# Patient Record
Sex: MALE
[De-identification: names, ages, dates, MRNs, and addresses within clinical notes are randomized per-mention and may not be internally consistent; named-entity substitution may affect disease eponyms.]

---

## 2024-02-15 ENCOUNTER — APPOINTMENT (OUTPATIENT)
Dept: ORTHOPEDIC SURGERY | Facility: CLINIC | Age: 29
End: 2024-02-15

## 2024-04-09 ENCOUNTER — NON-APPOINTMENT (OUTPATIENT)
Age: 29
End: 2024-04-09

## 2024-04-09 ENCOUNTER — APPOINTMENT (OUTPATIENT)
Dept: ORTHOPEDIC SURGERY | Facility: CLINIC | Age: 29
End: 2024-04-09
Payer: COMMERCIAL

## 2024-04-09 PROBLEM — Z00.00 ENCOUNTER FOR PREVENTIVE HEALTH EXAMINATION: Status: ACTIVE | Noted: 2024-04-09

## 2024-04-09 PROCEDURE — 73080 X-RAY EXAM OF ELBOW: CPT | Mod: RT

## 2024-04-09 PROCEDURE — 99204 OFFICE O/P NEW MOD 45 MIN: CPT

## 2024-04-09 NOTE — HISTORY OF PRESENT ILLNESS
[de-identified] : DELMAR is a 28 year male here today for right elbow pain. He endorses pain with extension. He localizes pain over the olecranon. He states that he leans on his elbow when driving. He enjoys working out.

## 2024-04-09 NOTE — DISCUSSION/SUMMARY
[de-identified] : We had a thorough discussion regarding the nature of his pain, the pathophysiology, as well as all treatment options. I recommend the patient apply Voltaren gel to the affected area. He should also wear a compression sleeve with a pad. All questions were answered and the patient verbalized understanding. The patient is in agreement with this treatment plan.  Rhomboid Transposition Flap Text: The defect edges were debeveled with a #15 scalpel blade.  Given the location of the defect and the proximity to free margins a rhomboid transposition flap was deemed most appropriate.  Using a sterile surgical marker, an appropriate rhomboid flap was drawn incorporating the defect.    The area thus outlined was incised deep to adipose tissue with a #15 scalpel blade.  The skin margins were undermined to an appropriate distance in all directions utilizing iris scissors.

## 2024-04-09 NOTE — PHYSICAL EXAM
[de-identified] : General: Well appearing, no acute distress Neurologic: A&Ox3, No focal deficits Head: NCAT without abrasions, lacerations, or ecchymosis to head, face, or scalp Eyes: No scleral icterus, no gross abnormalities Respiratory: Equal chest wall expansion bilaterally, no accessory muscle use Lymphatic: No lymphadenopathy palpated Skin: Warm and dry Psychiatric: Normal mood and affect   Right elbow shows no obvious deformity swelling or ecchymosis. There is tenderness to palpation over the olecranon. Elbow ROM 0-130 with pain. Full ROM with pronation and supination. No instability to valgus or varus stress in the elbow. Negative Tinel sign at the cubital tunnel and carpal tunnel. Compartments soft and nontender. Sensation intact distally. 2+ cap refill.  No tenderness to palpation of the distal radius and ulna or the left proximal humerus. [de-identified] : The following radiographs were ordered and read by me during this patient's visit. I reviewed each radiograph in detail with the patient and discussed the findings as highlighted below. 3 views of the right elbow were obtained today that show no fracture, dislocation. There is a traction spur noted.

## 2024-04-09 NOTE — CONSULT LETTER
[Dear  ___] : Dear  [unfilled], [Consult Letter:] : I had the pleasure of evaluating your patient, [unfilled]. [Please see my note below.] : Please see my note below. [Sincerely,] : Sincerely, [FreeTextEntry3] : Dr. Rl Multani

## 2024-04-09 NOTE — ADDENDUM
[FreeTextEntry1] : Documented by Tia Rodriguez acting as a scribe for Dr. Multani on 04/09/2024. All medical record entries made by the Scribe were at my, Dr. Multani's, direction and personally dictated by me on 04/09/2024. I have reviewed the chart and agree that the record accurately reflects my personal performance of the history, physical exam, procedure and imaging.

## 2024-06-18 ENCOUNTER — APPOINTMENT (OUTPATIENT)
Dept: ORTHOPEDIC SURGERY | Facility: CLINIC | Age: 29
End: 2024-06-18
Payer: COMMERCIAL

## 2024-06-18 DIAGNOSIS — M70.21 OLECRANON BURSITIS, RIGHT ELBOW: ICD-10-CM

## 2024-06-18 PROCEDURE — 99214 OFFICE O/P EST MOD 30 MIN: CPT

## 2024-07-01 NOTE — HISTORY OF PRESENT ILLNESS
[de-identified] : DELMAR is a 28 year male here today for follow up right elbow pain. He endorses continued pain with full extension. Patient states that he likes to work out and his right elbow pain is limiting his ability to weight lift as he used to.  Since the patient's last office visit in April, the patient has performed provider directed care for the right elbow which includes an exercise program, strength and conditioning with 6 weeks of anti-inflammatory use.  The patient has failed conservative management, continues to have right elbow pain.

## 2024-07-01 NOTE — PHYSICAL EXAM
[de-identified] : General: Well appearing, no acute distress Neurologic: A&Ox3, No focal deficits Head: NCAT without abrasions, lacerations, or ecchymosis to head, face, or scalp Eyes: No scleral icterus, no gross abnormalities Respiratory: Equal chest wall expansion bilaterally, no accessory muscle use Lymphatic: No lymphadenopathy palpated Skin: Warm and dry Psychiatric: Normal mood and affect   Right elbow shows no obvious deformity swelling or ecchymosis. There is tenderness to palpation over the olecranon. Elbow ROM 0-130 with pain. Full ROM with pronation and supination. No instability to valgus or varus stress in the elbow. Negative Tinel sign at the cubital tunnel and carpal tunnel. Compartments soft and nontender. Sensation intact distally. 2+ cap refill.  No tenderness to palpation of the distal radius and ulna or the left proximal humerus. [de-identified] : No new imaging.

## 2024-07-01 NOTE — DISCUSSION/SUMMARY
[de-identified] : We had a thorough discussion regarding the nature of his pain, the pathophysiology, as well as all treatment options. Considering the patient's current presentation of pain, physical exam, and radiographs an MRI is indicated at this time. A prescription for this was given to the patient. We will go over the MRI results with him upon obtaining the results in the office and advise him of further treatment options. He agrees with the above plan and all questions were answered. Follow up after MRI to review images.

## 2024-07-01 NOTE — ADDENDUM
[FreeTextEntry1] : Documented by Chantell Beckett acting as a scribe for Dr. Multani and Dany Oviedo PA-C on 06/18/2024. All medical record entries made by the Scribe were at my, Dr. Multani, and Dany Oviedo's, direction and personally dictated by me on 06/18/2024. I have reviewed the chart and agree that the record accurately reflects my personal performance of the history, physical exam, procedure and imaging.

## 2024-07-23 ENCOUNTER — APPOINTMENT (OUTPATIENT)
Age: 29
End: 2024-07-23
Payer: COMMERCIAL

## 2024-07-23 DIAGNOSIS — M77.8 OTHER ENTHESOPATHIES, NOT ELSEWHERE CLASSIFIED: ICD-10-CM

## 2024-07-23 DIAGNOSIS — M25.531 PAIN IN RIGHT WRIST: ICD-10-CM

## 2024-07-23 DIAGNOSIS — M70.21 OLECRANON BURSITIS, RIGHT ELBOW: ICD-10-CM

## 2024-07-23 PROCEDURE — 99214 OFFICE O/P EST MOD 30 MIN: CPT

## 2024-07-23 NOTE — DISCUSSION/SUMMARY
[de-identified] : We had a thorough discussion regarding the nature of his pain, the pathophysiology, as well as all treatment options. I discussed operative and non-operative treatment modalities. Patient was given prescription of formal physical therapy that he will perform 2x/wk for 6-8 wks. Recommend patient use elbow compression sleeve to ease symptoms. All questions were answered and the patient verbalized understanding. The patient is in agreement with this treatment plan. Patient will follow up in 6-8 wks for repeat clinical assessment.

## 2024-07-23 NOTE — HISTORY OF PRESENT ILLNESS
If you are a smoker, it is important for your health to stop smoking. Please be aware that second hand smoke is also harmful. [de-identified] : DELMAR is a 28 year male here today for follow up right elbow pain. Patient states that he likes to work out and his right elbow pain is limiting his ability to weight lift as he used to. The patient has failed conservative management, continues to have right elbow pain.

## 2024-07-23 NOTE — PHYSICAL EXAM
[de-identified] : General: Well appearing, no acute distress Neurologic: A&Ox3, No focal deficits Head: NCAT without abrasions, lacerations, or ecchymosis to head, face, or scalp Eyes: No scleral icterus, no gross abnormalities Respiratory: Equal chest wall expansion bilaterally, no accessory muscle use Lymphatic: No lymphadenopathy palpated Skin: Warm and dry Psychiatric: Normal mood and affect   Right elbow shows no obvious deformity swelling or ecchymosis. There is tenderness to palpation over the olecranon. Elbow ROM 0-130 with pain. Full ROM with pronation and supination. No instability to valgus or varus stress in the elbow. Negative Tinel sign at the cubital tunnel and carpal tunnel. Compartments soft and nontender. Sensation intact distally. 2+ cap refill.  No tenderness to palpation of the distal radius and ulna or the left proximal humerus. [de-identified] : No new imaging.

## 2024-07-23 NOTE — ADDENDUM
[FreeTextEntry1] : Documented by Chantell Beckett acting as a scribe for Dr. Multani and Dany Oviedo PA-C on 07/23/2024. All medical record entries made by the Scribe were at my, Dr. Multani, and Dany Oviedo's, direction and personally dictated by me on 07/23/2024. I have reviewed the chart and agree that the record accurately reflects my personal performance of the history, physical exam, procedure and imaging.

## 2024-07-23 NOTE — PHYSICAL EXAM
[de-identified] : General: Well appearing, no acute distress Neurologic: A&Ox3, No focal deficits Head: NCAT without abrasions, lacerations, or ecchymosis to head, face, or scalp Eyes: No scleral icterus, no gross abnormalities Respiratory: Equal chest wall expansion bilaterally, no accessory muscle use Lymphatic: No lymphadenopathy palpated Skin: Warm and dry Psychiatric: Normal mood and affect   Right elbow shows no obvious deformity swelling or ecchymosis. There is tenderness to palpation over the olecranon. Elbow ROM 0-130 with pain. Full ROM with pronation and supination. No instability to valgus or varus stress in the elbow. Negative Tinel sign at the cubital tunnel and carpal tunnel. Compartments soft and nontender. Sensation intact distally. 2+ cap refill.  No tenderness to palpation of the distal radius and ulna or the left proximal humerus. [de-identified] : No new imaging.

## 2024-07-23 NOTE — DISCUSSION/SUMMARY
[de-identified] : We had a thorough discussion regarding the nature of his pain, the pathophysiology, as well as all treatment options. I discussed operative and non-operative treatment modalities. Patient was given prescription of formal physical therapy that he will perform 2x/wk for 6-8 wks. Recommend patient use elbow compression sleeve to ease symptoms. All questions were answered and the patient verbalized understanding. The patient is in agreement with this treatment plan. Patient will follow up in 6-8 wks for repeat clinical assessment.

## 2024-07-23 NOTE — HISTORY OF PRESENT ILLNESS
[de-identified] : DELMAR is a 28 year male here today for follow up right elbow pain. Patient states that he likes to work out and his right elbow pain is limiting his ability to weight lift as he used to. The patient has failed conservative management, continues to have right elbow pain.

## 2024-09-10 ENCOUNTER — APPOINTMENT (OUTPATIENT)
Dept: ORTHOPEDIC SURGERY | Facility: CLINIC | Age: 29
End: 2024-09-10

## 2024-11-12 ENCOUNTER — APPOINTMENT (OUTPATIENT)
Dept: ORTHOPEDIC SURGERY | Facility: CLINIC | Age: 29
End: 2024-11-12
Payer: COMMERCIAL

## 2024-11-12 DIAGNOSIS — M70.21 OLECRANON BURSITIS, RIGHT ELBOW: ICD-10-CM

## 2024-11-12 DIAGNOSIS — M25.729 OSTEOPHYTE, UNSPECIFIED ELBOW: ICD-10-CM

## 2024-11-12 PROCEDURE — 99214 OFFICE O/P EST MOD 30 MIN: CPT

## 2024-11-21 ENCOUNTER — NON-APPOINTMENT (OUTPATIENT)
Age: 29
End: 2024-11-21

## 2024-11-25 ENCOUNTER — OUTPATIENT (OUTPATIENT)
Dept: INPATIENT UNIT | Facility: HOSPITAL | Age: 29
LOS: 1 days | Discharge: HOME | End: 2024-11-25
Payer: COMMERCIAL

## 2024-11-25 ENCOUNTER — APPOINTMENT (OUTPATIENT)
Dept: ORTHOPEDIC SURGERY | Facility: HOSPITAL | Age: 29
End: 2024-11-25

## 2024-11-25 VITALS
DIASTOLIC BLOOD PRESSURE: 63 MMHG | OXYGEN SATURATION: 99 % | RESPIRATION RATE: 17 BRPM | TEMPERATURE: 98 F | HEART RATE: 67 BPM | SYSTOLIC BLOOD PRESSURE: 124 MMHG

## 2024-11-25 VITALS
RESPIRATION RATE: 16 BRPM | SYSTOLIC BLOOD PRESSURE: 137 MMHG | WEIGHT: 184.97 LBS | TEMPERATURE: 99 F | HEIGHT: 72 IN | HEART RATE: 92 BPM | OXYGEN SATURATION: 100 % | DIASTOLIC BLOOD PRESSURE: 78 MMHG
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
## 2024-11-25 DIAGNOSIS — M25.729 OSTEOPHYTE, UNSPECIFIED ELBOW: ICD-10-CM
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
## 2024-11-25 DIAGNOSIS — M70.21 OLECRANON BURSITIS, RIGHT ELBOW: ICD-10-CM

## 2024-11-25 DIAGNOSIS — M25.721 OSTEOPHYTE, RIGHT ELBOW: ICD-10-CM

## 2024-11-25 DIAGNOSIS — S46.311A STRAIN OF MUSCLE, FASCIA AND TENDON OF TRICEPS, RIGHT ARM, INITIAL ENCOUNTER: ICD-10-CM

## 2024-11-25 PROCEDURE — 24342 REPAIR OF RUPTURED TENDON: CPT | Mod: RT

## 2024-11-25 PROCEDURE — 24147 PRTL EXC BONE OLECRN PROCESS: CPT | Mod: RT

## 2024-11-25 PROCEDURE — C1713: CPT

## 2024-11-25 PROCEDURE — C9399: CPT

## 2024-11-25 RX ORDER — 0.9 % SODIUM CHLORIDE 0.9 %
1000 INTRAVENOUS SOLUTION INTRAVENOUS
Refills: 0 | Status: DISCONTINUED | OUTPATIENT
Start: 2024-11-25 | End: 2024-11-25

## 2024-11-25 RX ORDER — ACETAMINOPHEN 500MG 500 MG/1
1 TABLET, COATED ORAL
Qty: 42 | Refills: 0
Start: 2024-11-25 | End: 2024-12-01

## 2024-11-25 RX ORDER — SENNOSIDES/DOCUSATE SODIUM 8.6MG-50MG
2 TABLET ORAL
Qty: 20 | Refills: 0
Start: 2024-11-25 | End: 2024-11-29

## 2024-11-25 RX ORDER — HYDROMORPHONE HYDROCHLORIDE 2 MG/1
0.5 TABLET ORAL
Refills: 0 | Status: DISCONTINUED | OUTPATIENT
Start: 2024-11-25 | End: 2024-11-25

## 2024-11-25 RX ORDER — OXYCODONE HYDROCHLORIDE 30 MG/1
5 TABLET ORAL ONCE
Refills: 0 | Status: DISCONTINUED | OUTPATIENT
Start: 2024-11-25 | End: 2024-11-25

## 2024-11-25 RX ORDER — ONDANSETRON HYDROCHLORIDE 4 MG/1
4 TABLET, FILM COATED ORAL ONCE
Refills: 0 | Status: DISCONTINUED | OUTPATIENT
Start: 2024-11-25 | End: 2024-11-25

## 2024-11-25 RX ORDER — OXYCODONE HYDROCHLORIDE 30 MG/1
1 TABLET ORAL
Qty: 20 | Refills: 0
Start: 2024-11-25 | End: 2024-11-29

## 2024-11-25 RX ORDER — NALOXONE HCL 0.4 MG/ML
4 AMPUL (ML) INJECTION
Qty: 1 | Refills: 0
Start: 2024-11-25 | End: 2024-11-25

## 2024-11-25 RX ORDER — FENTANYL 12 UG/H
50 PATCH, EXTENDED RELEASE TRANSDERMAL
Refills: 0 | Status: DISCONTINUED | OUTPATIENT
Start: 2024-11-25 | End: 2024-11-25

## 2024-11-25 RX ORDER — ONDANSETRON HYDROCHLORIDE 4 MG/1
1 TABLET, FILM COATED ORAL
Qty: 20 | Refills: 0
Start: 2024-11-25 | End: 2024-11-29

## 2024-11-25 RX ADMIN — OXYCODONE HYDROCHLORIDE 5 MILLIGRAM(S): 30 TABLET ORAL at 17:25

## 2024-11-25 RX ADMIN — FENTANYL 50 MICROGRAM(S): 12 PATCH, EXTENDED RELEASE TRANSDERMAL at 17:25

## 2024-11-25 RX ADMIN — FENTANYL 50 MICROGRAM(S): 12 PATCH, EXTENDED RELEASE TRANSDERMAL at 17:45

## 2024-11-25 RX ADMIN — OXYCODONE HYDROCHLORIDE 5 MILLIGRAM(S): 30 TABLET ORAL at 18:00

## 2024-11-25 RX ADMIN — Medication 125 MILLILITER(S): at 17:25

## 2024-11-25 NOTE — ASU DISCHARGE PLAN (ADULT/PEDIATRIC) - FINANCIAL ASSISTANCE
[facility] provides services at a reduced cost to those who are determined to be eligible through [facility]’s financial assistance program. Information regarding [facility]’s financial assistance program can be found by going to [email or web address] or by calling [phone].

## 2024-11-25 NOTE — ASU DISCHARGE PLAN (ADULT/PEDIATRIC) - NS MD DC FALL RISK RISK
For information on Fall & Injury Prevention, visit: [email or web address] OR  [email or web address] OR  https://www.cdc.gov/steadi/patient.html

## 2024-11-25 NOTE — ASU DISCHARGE PLAN (ADULT/PEDIATRIC) - CARE PROVIDER_API CALL
Rl Multani  Orthopaedic Sports Medicine  [address], [address]  [place] [address]  Phone: [phone]  Fax: [phone]  Follow Up Time:

## 2024-11-27 ENCOUNTER — NON-APPOINTMENT (OUTPATIENT)
Age: 29
End: 2024-11-27

## 2024-12-05 ENCOUNTER — APPOINTMENT (OUTPATIENT)
Dept: ORTHOPEDIC SURGERY | Facility: CLINIC | Age: 29
End: 2024-12-05
Payer: COMMERCIAL

## 2024-12-05 ENCOUNTER — NON-APPOINTMENT (OUTPATIENT)
Age: 29
End: 2024-12-05

## 2024-12-05 DIAGNOSIS — S46.311S STRAIN OF MUSCLE, FASCIA AND TENDON OF TRICEPS, RIGHT ARM, SEQUELA: ICD-10-CM

## 2024-12-05 DIAGNOSIS — M25.729 OSTEOPHYTE, UNSPECIFIED ELBOW: ICD-10-CM

## 2024-12-05 PROCEDURE — 99024 POSTOP FOLLOW-UP VISIT: CPT

## 2024-12-05 PROCEDURE — 73080 X-RAY EXAM OF ELBOW: CPT | Mod: RT

## 2025-01-06 ENCOUNTER — NON-APPOINTMENT (OUTPATIENT)
Age: 30
End: 2025-01-06

## 2025-01-16 ENCOUNTER — APPOINTMENT (OUTPATIENT)
Dept: ORTHOPEDIC SURGERY | Facility: CLINIC | Age: 30
End: 2025-01-16
Payer: COMMERCIAL

## 2025-01-16 DIAGNOSIS — S46.311S STRAIN OF MUSCLE, FASCIA AND TENDON OF TRICEPS, RIGHT ARM, SEQUELA: ICD-10-CM

## 2025-01-16 DIAGNOSIS — M25.729 OSTEOPHYTE, UNSPECIFIED ELBOW: ICD-10-CM

## 2025-01-16 DIAGNOSIS — M70.21 OLECRANON BURSITIS, RIGHT ELBOW: ICD-10-CM

## 2025-01-16 PROCEDURE — 99024 POSTOP FOLLOW-UP VISIT: CPT

## 2025-02-25 ENCOUNTER — APPOINTMENT (OUTPATIENT)
Dept: ORTHOPEDIC SURGERY | Facility: CLINIC | Age: 30
End: 2025-02-25
Payer: COMMERCIAL

## 2025-02-25 DIAGNOSIS — M25.729 OSTEOPHYTE, UNSPECIFIED ELBOW: ICD-10-CM

## 2025-02-25 DIAGNOSIS — S46.311S STRAIN OF MUSCLE, FASCIA AND TENDON OF TRICEPS, RIGHT ARM, SEQUELA: ICD-10-CM

## 2025-02-25 PROCEDURE — 99212 OFFICE O/P EST SF 10 MIN: CPT

## 2025-06-05 NOTE — BRIEF OPERATIVE NOTE - DISPOSITION
Action Requested: Summary for Provider     []  Critical Lab, Recommendations Needed  [] Need Additional Advice  []   FYI    []   Need Orders  [] Need Medications Sent to Pharmacy  []  Other     SUMMARY: Per protocol advised : Go To IC to be evaluated     No FM appointments available     Reason for call: Cough  Onset: Data Unavailable      Patient calling ( name and date of birth of patient verified ) states has persistent semi -  productive  cough with brown then green sputum, runny nose, Cough is worse at night , taken Claritin and Delsyum with slight relief     Denies fever, no chest pain , no shortness of breath     No FM appointments available , Advised to go to IC     Patient verbalizes understanding and agrees with treatment  plan.         Provided locations/ hours for [facility]     Reason for Disposition   Patient wants to be seen    Protocols used: Cough-A-[place]    
PACU to Home